# Patient Record
Sex: FEMALE | Race: WHITE | NOT HISPANIC OR LATINO
[De-identification: names, ages, dates, MRNs, and addresses within clinical notes are randomized per-mention and may not be internally consistent; named-entity substitution may affect disease eponyms.]

---

## 2020-11-09 PROBLEM — Z00.00 ENCOUNTER FOR PREVENTIVE HEALTH EXAMINATION: Status: ACTIVE | Noted: 2020-11-09

## 2020-12-09 ENCOUNTER — APPOINTMENT (OUTPATIENT)
Dept: OBGYN | Facility: CLINIC | Age: 25
End: 2020-12-09

## 2020-12-09 ENCOUNTER — APPOINTMENT (OUTPATIENT)
Dept: OBGYN | Facility: CLINIC | Age: 25
End: 2020-12-09
Payer: MEDICAID

## 2020-12-09 VITALS
HEIGHT: 61 IN | DIASTOLIC BLOOD PRESSURE: 64 MMHG | BODY MASS INDEX: 24.17 KG/M2 | TEMPERATURE: 96.2 F | SYSTOLIC BLOOD PRESSURE: 102 MMHG | WEIGHT: 128 LBS

## 2020-12-09 DIAGNOSIS — Z01.419 ENCOUNTER FOR GYNECOLOGICAL EXAMINATION (GENERAL) (ROUTINE) W/OUT ABNORMAL FINDINGS: ICD-10-CM

## 2020-12-09 PROCEDURE — 99072 ADDL SUPL MATRL&STAF TM PHE: CPT

## 2020-12-09 PROCEDURE — 99385 PREV VISIT NEW AGE 18-39: CPT

## 2020-12-09 NOTE — HISTORY OF PRESENT ILLNESS
[FreeTextEntry1] : Patient is 25 years old para 0000 last menstrual period November 11, 2020\par She states that she has a history of irregular periods until approximately one year ago when her menstrual periods have remained regular

## 2020-12-09 NOTE — DISCUSSION/SUMMARY
[FreeTextEntry1] : Pap,Gonorrhea and Chlamydia tests done\par Self breast exam stressed\par Keep menstrual calendar\par Followup yearly or p.r.n.

## 2020-12-14 DIAGNOSIS — B37.3 CANDIDIASIS OF VULVA AND VAGINA: ICD-10-CM

## 2020-12-14 RX ORDER — FLUCONAZOLE 150 MG/1
150 TABLET ORAL
Qty: 1 | Refills: 0 | Status: ACTIVE | COMMUNITY
Start: 2020-12-14 | End: 1900-01-01